# Patient Record
Sex: MALE | Race: OTHER | HISPANIC OR LATINO
[De-identification: names, ages, dates, MRNs, and addresses within clinical notes are randomized per-mention and may not be internally consistent; named-entity substitution may affect disease eponyms.]

---
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
## 2022-01-09 ENCOUNTER — EMERGENCY (EMERGENCY)
Facility: HOSPITAL | Age: 22
LOS: 1 days | Discharge: HOME | End: 2022-01-09
Attending: EMERGENCY MEDICINE
Payer: MEDICAID

## 2022-01-09 VITALS
WEIGHT: 179.9 LBS | RESPIRATION RATE: 18 BRPM | OXYGEN SATURATION: 95 % | TEMPERATURE: 98 F | DIASTOLIC BLOOD PRESSURE: 59 MMHG | HEIGHT: 69 IN | SYSTOLIC BLOOD PRESSURE: 118 MMHG | HEART RATE: 77 BPM

## 2022-01-09 PROBLEM — Z78.9 OTHER SPECIFIED HEALTH STATUS: Chronic | Status: ACTIVE | Noted: 2021-07-27

## 2022-01-09 LAB — SARS-COV-2 RNA SPEC QL NAA+PROBE: DETECTED

## 2022-01-09 PROCEDURE — 99284 EMERGENCY DEPT VISIT MOD MDM: CPT

## 2022-01-09 PROCEDURE — 94640 AIRWAY INHALATION TREATMENT: CPT

## 2022-01-09 PROCEDURE — 87635 SARS-COV-2 COVID-19 AMP PRB: CPT

## 2022-01-09 PROCEDURE — 96374 THER/PROPH/DIAG INJ IV PUSH: CPT

## 2022-01-09 PROCEDURE — 99284 EMERGENCY DEPT VISIT MOD MDM: CPT | Mod: 25

## 2022-01-09 PROCEDURE — 71046 X-RAY EXAM CHEST 2 VIEWS: CPT

## 2022-01-09 PROCEDURE — 71046 X-RAY EXAM CHEST 2 VIEWS: CPT | Mod: 26

## 2022-01-09 RX ORDER — DEXAMETHASONE 0.5 MG/5ML
6 ELIXIR ORAL ONCE
Refills: 0 | Status: COMPLETED | OUTPATIENT
Start: 2022-01-09 | End: 2022-01-09

## 2022-01-09 RX ORDER — IPRATROPIUM/ALBUTEROL SULFATE 18-103MCG
3 AEROSOL WITH ADAPTER (GRAM) INHALATION ONCE
Refills: 0 | Status: COMPLETED | OUTPATIENT
Start: 2022-01-09 | End: 2022-01-09

## 2022-01-09 RX ADMIN — Medication 3 MILLILITER(S): at 03:20

## 2022-01-09 RX ADMIN — Medication 6 MILLIGRAM(S): at 03:34

## 2022-01-09 NOTE — ED PROVIDER NOTE - PATIENT PORTAL LINK FT
You can access the FollowMyHealth Patient Portal offered by [facility] by registering at the following website: [email or web address]. By joining [facility]’s FollowMyHealth portal, you will also be able to view your health information using other applications (apps) compatible with our system.

## 2022-01-09 NOTE — ED ADULT NURSE NOTE - OBJECTIVE STATEMENT
Patient presented to the ED with c/o woke up from sleep with difficulty breathing, chest tightness and cough. Patient denies fever, headache, nausea, vomiting and dizziness.

## 2022-01-09 NOTE — ED PROVIDER NOTE - OBJECTIVE STATEMENT
20 y/o male who denies past medical history, admits to marijuana use occasionally, presents with feeling short of breath with some chest tightness.  pt was diagnosed with COVID approx 1.5 weeks ago.  Pt symptoms of fever and cough have improved till this evening when the patient was sleeping he felt short of breath.  Pt came to ED for further eval.

## 2022-01-09 NOTE — ED PROVIDER NOTE - CLINICAL SUMMARY MEDICAL DECISION MAKING FREE TEXT BOX
pt with recent COVID infection presents with episode of shortness of breath.  Mild expiratory wheeze on exam.  Will give duoneb, decadron, CXR, COVID swab abd reassess.

## 2022-01-09 NOTE — ED PROVIDER NOTE - NSFOLLOWUPINSTRUCTIONS_ED_ALL_ED_FT
Viral Syndrome    WHAT YOU NEED TO KNOW:    Viral syndrome is a term used for symptoms of an infection caused by a virus. Viruses are spread easily from person to person through the air and on shared items.    DISCHARGE INSTRUCTIONS:    Call your local emergency number (911 in the [place]) or have someone else call if:   •You have a seizure.      •You cannot be woken.      •You have chest pain or trouble breathing.      Seek care immediately if:   •You have a stiff neck, a bad headache, and sensitivity to light.      •You feel weak, dizzy, or confused.      •You stop urinating or urinate a lot less than usual.      •You cough up blood or thick yellow or green mucus.      •You have severe abdominal pain or your abdomen is larger than usual.      Call your doctor if:   •Your symptoms do not get better with treatment or get worse after 3 days.      •You have a rash or ear pain.      •You have burning when you urinate.      •You have questions or concerns about your condition or care.      Medicines: You may need any of the following:   •Acetaminophen decreases pain and fever. It is available without a doctor's order. Ask how much to take and how often to take it. Follow directions. Read the labels of all other medicines you are using to see if they also contain acetaminophen, or ask your doctor or pharmacist. Acetaminophen can cause liver damage if not taken correctly. Do not use more than 4 grams (4,000 milligrams) total of acetaminophen in one day.       •NSAIDs, such as ibuprofen, help decrease swelling, pain, and fever. NSAIDs can cause stomach bleeding or kidney problems in certain people. If you take blood thinner medicine, always ask your healthcare provider if NSAIDs are safe for you. Always read the medicine label and follow directions.      •Cold medicine helps decrease swelling, control a cough, and relieve chest or nasal congestion.       •Saline nasal spray helps decrease nasal congestion.       •Take your medicine as directed. Contact your healthcare provider if you think your medicine is not helping or if you have side effects. Tell him of her if you are allergic to any medicine. Keep a list of the medicines, vitamins, and herbs you take. Include the amounts, and when and why you take them. Bring the list or the pill bottles to follow-up visits. Carry your medicine list with you in case of an emergency.      Manage your symptoms:   •Drink liquids as directed to prevent dehydration. Ask how much liquid to drink each day and which liquids are best for you. Ask if you should drink an oral rehydration solution (ORS). An ORS has the right amounts of water, salts, and sugar you need to replace body fluids. This may help prevent dehydration caused by vomiting or diarrhea. Do not drink liquids with caffeine. Liquids with caffeine can make dehydration worse.      •Get plenty of rest to help your body heal. Take naps throughout the day. Ask your healthcare provider when you can return to work and your normal activities.      •Use a cool mist humidifier to help you breathe easier. Ask your healthcare provider how to use a cool mist humidifier.      •Eat honey or use cough drops for a sore throat. Cough drops are available without a doctor's order. Follow directions for taking cough drops.      •Do not smoke or be close to anyone who is smoking. Nicotine and other chemicals in cigarettes and cigars can cause lung damage. Smoking can also delay healing. Ask your healthcare provider for information if you currently smoke and need help to quit. E-cigarettes or smokeless tobacco still contain nicotine. Talk to your healthcare provider before you use these products.      Prevent the spread of germs:          •Wash your hands often. Wash your hands several times each day. Wash after you use the bathroom, change a child's diaper, and before you prepare or eat food. Use soap and water every time. Rub your soapy hands together, lacing your fingers. Wash the front and back of your hands, and in between your fingers. Use the fingers of one hand to scrub under the fingernails of the other hand. Wash for at least 20 seconds. Rinse with warm, running water for several seconds. Then dry your hands with a clean towel or paper towel. Use hand  that contains alcohol if soap and water are not available. Do not touch your eyes, nose, or mouth without washing your hands first.  Handwashing           •Cover a sneeze or cough. Use a tissue that covers your mouth and nose. Throw the tissue away in a trash can right away. Use the bend of your arm if a tissue is not available. Wash your hands well with soap and water or use a hand .      •Stay away from others while you are sick. Avoid crowds as much as possible.      •Ask about vaccines you may need. Talk to your healthcare provider about your vaccine history. He or she will tell you which vaccines you need, and when to get them.?Get the influenza (flu) vaccine as soon as recommended each year. The flu vaccine is available starting in September or October. Flu viruses change, so it is important to get a flu vaccine every year.      ?Get the pneumonia vaccine if recommended. This vaccine is usually recommended every 5 years. Your provider will tell you when to get this vaccine, if needed.        Follow up with your doctor as directed: Write down your questions so you remember to ask them during your visits.       © Copyright [employer] 2022           back to top                          © Copyright [employer] 2022

## 2022-01-13 ENCOUNTER — TRANSCRIPTION ENCOUNTER (OUTPATIENT)
Age: 22
End: 2022-01-13

## 2023-12-24 ENCOUNTER — EMERGENCY (EMERGENCY)
Facility: HOSPITAL | Age: 23
LOS: 1 days | Discharge: HOME | End: 2023-12-24
Attending: EMERGENCY MEDICINE
Payer: MEDICAID

## 2023-12-24 VITALS
SYSTOLIC BLOOD PRESSURE: 125 MMHG | TEMPERATURE: 101 F | RESPIRATION RATE: 18 BRPM | OXYGEN SATURATION: 100 % | DIASTOLIC BLOOD PRESSURE: 74 MMHG | HEART RATE: 86 BPM

## 2023-12-24 VITALS
RESPIRATION RATE: 18 BRPM | WEIGHT: 210.1 LBS | TEMPERATURE: 99 F | SYSTOLIC BLOOD PRESSURE: 137 MMHG | DIASTOLIC BLOOD PRESSURE: 80 MMHG | HEIGHT: 71 IN | OXYGEN SATURATION: 97 % | HEART RATE: 85 BPM

## 2023-12-24 LAB
ALBUMIN SERPL ELPH-MCNC: 3.9 G/DL — SIGNIFICANT CHANGE UP (ref 3.5–5)
ALBUMIN SERPL ELPH-MCNC: 3.9 G/DL — SIGNIFICANT CHANGE UP (ref 3.5–5)
ALP SERPL-CCNC: 79 U/L — SIGNIFICANT CHANGE UP (ref 40–120)
ALP SERPL-CCNC: 79 U/L — SIGNIFICANT CHANGE UP (ref 40–120)
ALT FLD-CCNC: 36 U/L DA — SIGNIFICANT CHANGE UP (ref 10–60)
ALT FLD-CCNC: 36 U/L DA — SIGNIFICANT CHANGE UP (ref 10–60)
ANION GAP SERPL CALC-SCNC: 9 MMOL/L — SIGNIFICANT CHANGE UP (ref 5–17)
ANION GAP SERPL CALC-SCNC: 9 MMOL/L — SIGNIFICANT CHANGE UP (ref 5–17)
AST SERPL-CCNC: 29 U/L — SIGNIFICANT CHANGE UP (ref 10–40)
AST SERPL-CCNC: 29 U/L — SIGNIFICANT CHANGE UP (ref 10–40)
BASOPHILS # BLD AUTO: 0.04 K/UL — SIGNIFICANT CHANGE UP (ref 0–0.2)
BASOPHILS # BLD AUTO: 0.04 K/UL — SIGNIFICANT CHANGE UP (ref 0–0.2)
BASOPHILS NFR BLD AUTO: 0.3 % — SIGNIFICANT CHANGE UP (ref 0–2)
BASOPHILS NFR BLD AUTO: 0.3 % — SIGNIFICANT CHANGE UP (ref 0–2)
BILIRUB SERPL-MCNC: 0.3 MG/DL — SIGNIFICANT CHANGE UP (ref 0.2–1.2)
BILIRUB SERPL-MCNC: 0.3 MG/DL — SIGNIFICANT CHANGE UP (ref 0.2–1.2)
BUN SERPL-MCNC: 11 MG/DL — SIGNIFICANT CHANGE UP (ref 7–18)
BUN SERPL-MCNC: 11 MG/DL — SIGNIFICANT CHANGE UP (ref 7–18)
CALCIUM SERPL-MCNC: 8.2 MG/DL — LOW (ref 8.4–10.5)
CALCIUM SERPL-MCNC: 8.2 MG/DL — LOW (ref 8.4–10.5)
CHLORIDE SERPL-SCNC: 100 MMOL/L — SIGNIFICANT CHANGE UP (ref 96–108)
CHLORIDE SERPL-SCNC: 100 MMOL/L — SIGNIFICANT CHANGE UP (ref 96–108)
CO2 SERPL-SCNC: 25 MMOL/L — SIGNIFICANT CHANGE UP (ref 22–31)
CO2 SERPL-SCNC: 25 MMOL/L — SIGNIFICANT CHANGE UP (ref 22–31)
CREAT SERPL-MCNC: 0.99 MG/DL — SIGNIFICANT CHANGE UP (ref 0.5–1.3)
CREAT SERPL-MCNC: 0.99 MG/DL — SIGNIFICANT CHANGE UP (ref 0.5–1.3)
EGFR: 111 ML/MIN/1.73M2 — SIGNIFICANT CHANGE UP
EGFR: 111 ML/MIN/1.73M2 — SIGNIFICANT CHANGE UP
EOSINOPHIL # BLD AUTO: 0.05 K/UL — SIGNIFICANT CHANGE UP (ref 0–0.5)
EOSINOPHIL # BLD AUTO: 0.05 K/UL — SIGNIFICANT CHANGE UP (ref 0–0.5)
EOSINOPHIL NFR BLD AUTO: 0.3 % — SIGNIFICANT CHANGE UP (ref 0–6)
EOSINOPHIL NFR BLD AUTO: 0.3 % — SIGNIFICANT CHANGE UP (ref 0–6)
FLUAV AG NPH QL: SIGNIFICANT CHANGE UP
FLUAV AG NPH QL: SIGNIFICANT CHANGE UP
FLUBV AG NPH QL: SIGNIFICANT CHANGE UP
FLUBV AG NPH QL: SIGNIFICANT CHANGE UP
GLUCOSE SERPL-MCNC: 101 MG/DL — HIGH (ref 70–99)
GLUCOSE SERPL-MCNC: 101 MG/DL — HIGH (ref 70–99)
HCT VFR BLD CALC: 44.7 % — SIGNIFICANT CHANGE UP (ref 39–50)
HCT VFR BLD CALC: 44.7 % — SIGNIFICANT CHANGE UP (ref 39–50)
HGB BLD-MCNC: 15.1 G/DL — SIGNIFICANT CHANGE UP (ref 13–17)
HGB BLD-MCNC: 15.1 G/DL — SIGNIFICANT CHANGE UP (ref 13–17)
HIV 1 & 2 AB SERPL IA.RAPID: SIGNIFICANT CHANGE UP
HIV 1 & 2 AB SERPL IA.RAPID: SIGNIFICANT CHANGE UP
IMM GRANULOCYTES NFR BLD AUTO: 0.6 % — SIGNIFICANT CHANGE UP (ref 0–0.9)
IMM GRANULOCYTES NFR BLD AUTO: 0.6 % — SIGNIFICANT CHANGE UP (ref 0–0.9)
LACTATE SERPL-SCNC: 1.1 MMOL/L — SIGNIFICANT CHANGE UP (ref 0.7–2)
LACTATE SERPL-SCNC: 1.1 MMOL/L — SIGNIFICANT CHANGE UP (ref 0.7–2)
LIDOCAIN IGE QN: 17 U/L — SIGNIFICANT CHANGE UP (ref 13–75)
LIDOCAIN IGE QN: 17 U/L — SIGNIFICANT CHANGE UP (ref 13–75)
LYMPHOCYTES # BLD AUTO: 0.97 K/UL — LOW (ref 1–3.3)
LYMPHOCYTES # BLD AUTO: 0.97 K/UL — LOW (ref 1–3.3)
LYMPHOCYTES # BLD AUTO: 6.8 % — LOW (ref 13–44)
LYMPHOCYTES # BLD AUTO: 6.8 % — LOW (ref 13–44)
MAGNESIUM SERPL-MCNC: 1.8 MG/DL — SIGNIFICANT CHANGE UP (ref 1.6–2.6)
MAGNESIUM SERPL-MCNC: 1.8 MG/DL — SIGNIFICANT CHANGE UP (ref 1.6–2.6)
MCHC RBC-ENTMCNC: 29.1 PG — SIGNIFICANT CHANGE UP (ref 27–34)
MCHC RBC-ENTMCNC: 29.1 PG — SIGNIFICANT CHANGE UP (ref 27–34)
MCHC RBC-ENTMCNC: 33.8 GM/DL — SIGNIFICANT CHANGE UP (ref 32–36)
MCHC RBC-ENTMCNC: 33.8 GM/DL — SIGNIFICANT CHANGE UP (ref 32–36)
MCV RBC AUTO: 86.1 FL — SIGNIFICANT CHANGE UP (ref 80–100)
MCV RBC AUTO: 86.1 FL — SIGNIFICANT CHANGE UP (ref 80–100)
MONOCYTES # BLD AUTO: 1.33 K/UL — HIGH (ref 0–0.9)
MONOCYTES # BLD AUTO: 1.33 K/UL — HIGH (ref 0–0.9)
MONOCYTES NFR BLD AUTO: 9.3 % — SIGNIFICANT CHANGE UP (ref 2–14)
MONOCYTES NFR BLD AUTO: 9.3 % — SIGNIFICANT CHANGE UP (ref 2–14)
NEUTROPHILS # BLD AUTO: 11.9 K/UL — HIGH (ref 1.8–7.4)
NEUTROPHILS # BLD AUTO: 11.9 K/UL — HIGH (ref 1.8–7.4)
NEUTROPHILS NFR BLD AUTO: 82.7 % — HIGH (ref 43–77)
NEUTROPHILS NFR BLD AUTO: 82.7 % — HIGH (ref 43–77)
NRBC # BLD: 0 /100 WBCS — SIGNIFICANT CHANGE UP (ref 0–0)
NRBC # BLD: 0 /100 WBCS — SIGNIFICANT CHANGE UP (ref 0–0)
PHOSPHATE SERPL-MCNC: 3 MG/DL — SIGNIFICANT CHANGE UP (ref 2.5–4.5)
PHOSPHATE SERPL-MCNC: 3 MG/DL — SIGNIFICANT CHANGE UP (ref 2.5–4.5)
PLATELET # BLD AUTO: 306 K/UL — SIGNIFICANT CHANGE UP (ref 150–400)
PLATELET # BLD AUTO: 306 K/UL — SIGNIFICANT CHANGE UP (ref 150–400)
POTASSIUM SERPL-MCNC: 3.5 MMOL/L — SIGNIFICANT CHANGE UP (ref 3.5–5.3)
POTASSIUM SERPL-MCNC: 3.5 MMOL/L — SIGNIFICANT CHANGE UP (ref 3.5–5.3)
POTASSIUM SERPL-SCNC: 3.5 MMOL/L — SIGNIFICANT CHANGE UP (ref 3.5–5.3)
POTASSIUM SERPL-SCNC: 3.5 MMOL/L — SIGNIFICANT CHANGE UP (ref 3.5–5.3)
PROT SERPL-MCNC: 8 G/DL — SIGNIFICANT CHANGE UP (ref 6–8.3)
PROT SERPL-MCNC: 8 G/DL — SIGNIFICANT CHANGE UP (ref 6–8.3)
RBC # BLD: 5.19 M/UL — SIGNIFICANT CHANGE UP (ref 4.2–5.8)
RBC # BLD: 5.19 M/UL — SIGNIFICANT CHANGE UP (ref 4.2–5.8)
RBC # FLD: 12.4 % — SIGNIFICANT CHANGE UP (ref 10.3–14.5)
RBC # FLD: 12.4 % — SIGNIFICANT CHANGE UP (ref 10.3–14.5)
SARS-COV-2 RNA SPEC QL NAA+PROBE: SIGNIFICANT CHANGE UP
SARS-COV-2 RNA SPEC QL NAA+PROBE: SIGNIFICANT CHANGE UP
SODIUM SERPL-SCNC: 134 MMOL/L — LOW (ref 135–145)
SODIUM SERPL-SCNC: 134 MMOL/L — LOW (ref 135–145)
TROPONIN I, HIGH SENSITIVITY RESULT: 16.2 NG/L — SIGNIFICANT CHANGE UP
TROPONIN I, HIGH SENSITIVITY RESULT: 16.2 NG/L — SIGNIFICANT CHANGE UP
WBC # BLD: 14.37 K/UL — HIGH (ref 3.8–10.5)
WBC # BLD: 14.37 K/UL — HIGH (ref 3.8–10.5)
WBC # FLD AUTO: 14.37 K/UL — HIGH (ref 3.8–10.5)
WBC # FLD AUTO: 14.37 K/UL — HIGH (ref 3.8–10.5)

## 2023-12-24 PROCEDURE — 83735 ASSAY OF MAGNESIUM: CPT

## 2023-12-24 PROCEDURE — 80053 COMPREHEN METABOLIC PANEL: CPT

## 2023-12-24 PROCEDURE — 84484 ASSAY OF TROPONIN QUANT: CPT

## 2023-12-24 PROCEDURE — 36415 COLL VENOUS BLD VENIPUNCTURE: CPT

## 2023-12-24 PROCEDURE — 99284 EMERGENCY DEPT VISIT MOD MDM: CPT | Mod: 25

## 2023-12-24 PROCEDURE — 96374 THER/PROPH/DIAG INJ IV PUSH: CPT

## 2023-12-24 PROCEDURE — 85025 COMPLETE CBC W/AUTO DIFF WBC: CPT

## 2023-12-24 PROCEDURE — 84100 ASSAY OF PHOSPHORUS: CPT

## 2023-12-24 PROCEDURE — 96375 TX/PRO/DX INJ NEW DRUG ADDON: CPT

## 2023-12-24 PROCEDURE — 86703 HIV-1/HIV-2 1 RESULT ANTBDY: CPT

## 2023-12-24 PROCEDURE — 99284 EMERGENCY DEPT VISIT MOD MDM: CPT

## 2023-12-24 PROCEDURE — 83690 ASSAY OF LIPASE: CPT

## 2023-12-24 PROCEDURE — 83605 ASSAY OF LACTIC ACID: CPT

## 2023-12-24 PROCEDURE — 87637 SARSCOV2&INF A&B&RSV AMP PRB: CPT

## 2023-12-24 RX ORDER — ONDANSETRON 8 MG/1
4 TABLET, FILM COATED ORAL ONCE
Refills: 0 | Status: COMPLETED | OUTPATIENT
Start: 2023-12-24 | End: 2023-12-24

## 2023-12-24 RX ORDER — SODIUM CHLORIDE 9 MG/ML
2000 INJECTION INTRAMUSCULAR; INTRAVENOUS; SUBCUTANEOUS ONCE
Refills: 0 | Status: COMPLETED | OUTPATIENT
Start: 2023-12-24 | End: 2023-12-24

## 2023-12-24 RX ORDER — ONDANSETRON 8 MG/1
1 TABLET, FILM COATED ORAL
Qty: 30 | Refills: 0
Start: 2023-12-24 | End: 2023-12-28

## 2023-12-24 RX ORDER — FAMOTIDINE 10 MG/ML
20 INJECTION INTRAVENOUS ONCE
Refills: 0 | Status: COMPLETED | OUTPATIENT
Start: 2023-12-24 | End: 2023-12-24

## 2023-12-24 RX ADMIN — ONDANSETRON 4 MILLIGRAM(S): 8 TABLET, FILM COATED ORAL at 21:11

## 2023-12-24 RX ADMIN — SODIUM CHLORIDE 4000 MILLILITER(S): 9 INJECTION INTRAMUSCULAR; INTRAVENOUS; SUBCUTANEOUS at 21:10

## 2023-12-24 RX ADMIN — FAMOTIDINE 20 MILLIGRAM(S): 10 INJECTION INTRAVENOUS at 21:11

## 2023-12-24 RX ADMIN — Medication 20 MILLIGRAM(S): at 22:41

## 2023-12-24 NOTE — ED PROVIDER NOTE - CLINICAL SUMMARY MEDICAL DECISION MAKING FREE TEXT BOX
23 male with hx of no known medical problems   Pt presenting to the ED reporting 1-2 days of vomiting/diarrhea.   Vitals stable.  Nontoxic appearing, n/v intact. Airway intact, no respiratory distress, no hypoxia. No abdominal or CVA tenderness.    Plan to obtain:    -Labs, IV fluids, analgesia, antiemetics, & antipyretics as needed, observe/re-assess     Lab values demonstrate no acute/emergent pathology.  IV fluids & antiemetics given w improved symptoms. Tolerating PO intake in ED. No abd tender on repeat exam  Pt advised regarding need for close outpatient follow up.  Patient stable for further care in outpatient setting. No indication for inpatient admission at this time. Patient advised regarding symptomatic & supportive care and symptoms to prompt ED return. Strict return precautions provided.

## 2023-12-24 NOTE — ED PROVIDER NOTE - NSFOLLOWUPINSTRUCTIONS_ED_ALL_ED_FT
23 male with hx of no known medical problems   Pt presenting to the ED reporting 2 days of vomiting/diarrhea.   Vitals stable.  Nontoxic appearing, n/v intact. Airway intact, no respiratory distress, no hypoxia. No abdominal or CVA tenderness.    Plan to obtain:    -Labs, IV fluids, analgesia, antiemetics, & antipyretics as needed, observe/re-assess     Lab values demonstrate no acute/emergent pathology.    Independent interpretation of the EKG: Sinus @ ***, normal axis, normal intervals, normal ST/T  Independent interpretation of XR: ***    ***    Pt advised regarding need for close outpatient follow up.  Patient stable for further care in outpatient setting. No indication for inpatient admission at this time. Patient advised regarding symptomatic & supportive care and symptoms to prompt ED return. Strict return precautions provided.  ***  Patient requires inpatient admission for further care & stabilization. Care signed out to inpatient team. Please follow up with your PMD or Medicine Clinic in 2-3 days.  Return to the ER for worsening or concerning symptoms.  Drink plenty of fluids or an oral rehydration solution like Pedialyte, Gatorade, or Powerade.  Keep your diet simple until symptoms improve. Introduce foods as tolerated such as bread, toast, plain rice, boiled potatoes, boiled chicken, bananas, apple sauce, etc. Avoid dairy, spicy, greasy, or fatty foods. Avoid alcohol or tobacco.  Take Famotidine (Pepcid) 20mg (1 tablet) every 12 hours as needed for abdominal pain.  Take Maalox 30mL every 6 hours as needed for abdominal pain.  Take Ondansetron (Zofran) 4mg every 6 hours as needed for nausea or vomiting.    - - - - - - - - - - - - -  Nausea and Vomiting, Adult  Nausea is feeling that you have an upset stomach and that you are about to vomit. Vomiting is when food in your stomach forcefully comes out of your mouth. Vomiting can make you feel weak. If you vomit, or if you are not able to drink enough fluids, you may not have enough water in your body (get dehydrated). If you do not have enough water in your body, you may:  Feel tired.  Feel thirsty.  Have a dry mouth.  Have cracked lips.  Pee (urinate) less often.  Older adults and people with other diseases or a weak body defense system (immune system) are at higher risk for not having enough water in the body. If you feel like you may vomit or you vomit, it is important to follow instructions from your doctor about how to take care of yourself.    Follow these instructions at home:  Watch your symptoms for any changes. Tell your doctor about them.    Eating and drinking    A bottle of clear fruit juice and glass of water.  A sign showing that a person should not drink alcohol.  Take an ORS (oral rehydration solution). This is a drink that is sold at pharmacies and stores.  Drink clear fluids in small amounts as you are able, such as:  Water.  Ice chips.  Fruit juice that has water added (diluted fruit juice).  Low-calorie sports drinks.  Eat bland, easy-to-digest foods in small amounts as you are able, such as:  Bananas.  Applesauce.  Rice.  Low-fat (lean) meats.  Toast.  Crackers.  Avoid drinking fluids that have a lot of sugar or caffeine in them. This includes energy drinks, sports drinks, and soda.  Avoid alcohol.  Avoid spicy or fatty foods.  General instructions    Take over-the-counter and prescription medicines only as told by your doctor.  Drink enough fluid to keep your pee (urine) pale yellow.  Wash your hands often with soap and water for at least 20 seconds. If you cannot use soap and water, use hand .  Make sure that everyone in your home washes their hands well and often.  Rest at home until you feel better.  Watch your condition for any changes.  Take slow and deep breaths when you feel like you may vomit.  Keep all follow-up visits.  Contact a doctor if:  Your symptoms get worse.  You have new symptoms.  You have a fever.  You cannot drink fluids without vomiting.  You feel like you may vomit for more than 2 days.  You feel light-headed or dizzy.  You have a headache.  You have muscle cramps.  You have a rash.  You have pain while peeing.  Get help right away if:  You have pain in your chest, neck, arm, or jaw.  You feel very weak or you faint.  You vomit again and again.  You have vomit that is bright red or looks like black coffee grounds.  You have bloody or black poop (stools) or poop that looks like tar.  You have a very bad headache, a stiff neck, or both.  You have very bad pain, cramping, or bloating in your belly (abdomen).  You have trouble breathing.  You are breathing very quickly.  Your heart is beating very quickly.  Your skin feels cold and clammy.  You feel confused.  You have signs of losing too much water in your body, such as:  Dark pee, very little pee, or no pee.  Cracked lips.  Dry mouth.  Sunken eyes.  Sleepiness.  Weakness.  These symptoms may be an emergency. Get help right away. Call 911.  Do not wait to see if the symptoms will go away.  Do not drive yourself to the hospital.  Summary  Nausea is feeling that you have an upset stomach and that you are about to vomit. Vomiting is when food in your stomach comes out of your mouth.  Follow instructions from your doctor about eating and drinking.  Take over-the-counter and prescription medicines only as told by your doctor.  Contact your doctor if your symptoms get worse or you have new symptoms.  Keep all follow-up visits.  This information is not intended to replace advice given to you by your health care provider. Make sure you discuss any questions you have with your health care provider.  - - - - - - - - - - - - -  Diarrhea, Adult  Diarrhea is when you pass loose and sometimes watery poop (stool) often. Diarrhea can make you feel weak and cause you to lose water in your body (get dehydrated). Losing water in your body can cause you to:  Feel tired and thirsty.  Have a dry mouth.  Go pee (urinate) less often.  Diarrhea often lasts 2–3 days. It can last longer if it is a sign of something more serious. Be sure to treat your diarrhea as told by your doctor.    Follow these instructions at home:  Eating and drinking    A bottle of clear juice and a glass of water.  Bread, rice, and cereal from the grain group.   Follow these instructions as told by your doctor:  Take an ORS (oral rehydration solution). This is a drink that helps you replace fluids and minerals your body lost. It is sold at pharmacies and stores.  Drink enough fluid to keep your pee (urine) pale yellow.  Drink fluids such as:  Water. You can also get fluids by sucking on ice chips.  Diluted fruit juice.  Low-calorie sports drinks.  Milk.  Avoid drinking fluids that have a lot of sugar or caffeine in them. These include soda, energy drinks, and regular sports drinks.  Avoid alcohol.  Eat bland, easy-to-digest foods in small amounts as you are able. These foods include:  Bananas.  Applesauce.  Rice.  Low-fat (lean) meats.  Toast.  Crackers.  Avoid spicy or fatty foods.  Medicines    Take over-the-counter and prescription medicines only as told by your doctor.  If you were prescribed antibiotics, take them as told by your doctor. Do not stop taking them even if you start to feel better.  General instructions    Washing hands with soap and water.  Wash your hands often using soap and water for 20 seconds. If soap and water are not available, use hand . Others in your home should wash their hands as well. Wash your hands:  After using the toilet or changing a diaper.  Before preparing, cooking, or serving food.  While caring for a sick person.  While visiting someone in a hospital.  Rest at home while you get better.  Take a warm bath to help with any burning or pain from having diarrhea.  Watch your condition for any changes.  Contact a doctor if:  You have a fever.  Your diarrhea gets worse.  You have new symptoms.  You vomit every time you eat or drink.  You feel light-headed, dizzy, or you have a headache.  You have muscle cramps.  You have signs of losing too much water in your body, such as:  Dark pee, very little pee, or no pee.  Cracked lips.  Dry mouth.  Sunken eyes.  Sleepiness.  Weakness.  You have bloody or black poop or poop that looks like tar.  You have very bad pain, cramping, or bloating in your belly (abdomen).  Your skin feels cold and clammy.  You feel confused.  Get help right away if:  You have chest pain.  Your heart is beating very quickly.  You have trouble breathing or you are breathing very quickly.  You feel very weak or you faint.  These symptoms may be an emergency. Get help right away. Call 911.  Do not wait to see if the symptoms will go away.  Do not drive yourself to the hospital.  This information is not intended to replace advice given to you by your health care provider. Make sure you discuss any questions you have with your health care provider.

## 2023-12-24 NOTE — ED PROVIDER NOTE - NS ED ROS FT
Constitutional: (-) fever (+) chills  HENT: (-) congestion (-) rhinorrhea (-) sore throat  Eyes: (-) pain (-) redness  Respiratory: (-) cough (-) shortness of breath (-) wheezing (-) stridor    Cardiovascular: (-) chest pain (-) palpitations (-) leg swelling  Gastrointestinal: (+) abdominal cramping only right before BM but not currently (-) blood in stool (no melena/hematochezia) (+) diarrhea (+) vomiting  Genitourinary: (-) dysuria (-) hematuria  Musculoskeletal: (-) gait problem (-) joint swelling (+) myalgias  Skin: (-) color change (-) rash  Neurological: (-) weakness (-) numbness (-) headaches  Psychiatric/Behavioral: (-) confusion

## 2023-12-24 NOTE — ED ADULT NURSE NOTE - NSFALLUNIVINTERV_ED_ALL_ED
Bed/Stretcher in lowest position, wheels locked, appropriate side rails in place/Call bell, personal items and telephone in reach/Instruct patient to call for assistance before getting out of bed/chair/stretcher/Non-slip footwear applied when patient is off stretcher/[place] to call system/Physically safe environment - no spills, clutter or unnecessary equipment/Purposeful proactive rounding/Room/bathroom lighting operational, light cord in reach Bed/Stretcher in lowest position, wheels locked, appropriate side rails in place/Call bell, personal items and telephone in reach/Instruct patient to call for assistance before getting out of bed/chair/stretcher/Non-slip footwear applied when patient is off stretcher/[place] to call system/Physically safe environment - no spills, clutter or unnecessary equipment/Purposeful proactive rounding/Room/bathroom lighting operational, light cord in reach

## 2023-12-24 NOTE — ED PROVIDER NOTE - NSFOLLOWUPCLINICSTOKEN_GEN_ALL_ED_FT
041425:4-6 Days|| ||00\01||False;835141:1-3 Days|| ||00\01||False; 435647:4-6 Days|| ||00\01||False;753817:1-3 Days|| ||00\01||False;

## 2023-12-24 NOTE — ED PROVIDER NOTE - PHYSICAL EXAMINATION
Gen:  Awake, alert, NAD, WDWN, NCAT, non-toxic appearing.   Eyes:  PERRL, EOMI, no icterus, normal lids/lashes, normal conjunctivae.  ENT:  External inspection normal, pink/dry membranes.   CV:  S1S2, regular rate and rhythm, no murmur/gallops/rubs, no JVD, 2+ pulses b/l, no edema/cords/homans, warm/well-perfused.  Resp:  Normal respiratory rate/effort, no respiratory distress, normal voice, speaking full sentences, lungs clear to auscultation b/l, no wheezing/rales/rhonchi, no retractions, no stridor.  Abd:  Soft abdomen, no tender/distended/guarding/rebound, no pulsatile mass, no CVA tender.   Musculoskeletal:  N/V intact, FROM all 4 extremities, normal motor tone, stable gait.   Neck:  FROM neck, supple, trachea midline, no meningismus.  Skin:  Color normal for race, warm and dry, no rash.  Neuro:  Oriented x3, CN 2-12 intact (grossly), normal motor (grossly), normal sensory (grossly), normal gait. GCS 15  Psych:  Attention normal. Affect normal. Behavior normal. Judgment normal.

## 2023-12-24 NOTE — ED PROVIDER NOTE - NSFOLLOWUPCLINICS_GEN_ALL_ED_FT
[facility]  Gastroenterology  [address] [place] [address]  Phone: [phone]  Fax: [phone]  Follow Up Time: 4-6 Days    [facility]  Internal Medicine  [address] [place] [address]  Phone: [phone]  Fax: [phone]  Follow Up Time: 1-3 Days     [facility]  Gastroenterology  [address] [place] [address]  Phone: [phone]  Fax: [phone]  Follow Up Time: 4-6 Days    [facility]  Internal Medicine  [address] [place] [address]  Phone: [phone]  Fax: [phone]  Follow Up Time: 1-3 Days

## 2023-12-24 NOTE — ED PROVIDER NOTE - PROGRESS NOTE DETAILS
Results reviewed.  Pt reports feeling better. No abd tender on reassessment  Tolerating PO intake.  Pt/family advised regarding symptomatic/supportive care, importance of PMD follow up, and symptoms to prompt ED return.

## 2023-12-24 NOTE — ED PROVIDER NOTE - OBJECTIVE STATEMENT
23 male with hx of no known medical problems   Pt presenting to the ED reporting 2 days of vomiting/diarrhea.   No recent travel, hospitalization, or immobilization. No recent abx or sick contacts. No new or suspected bad foods. 23 male with hx of no known medical problems   Pt presenting to the ED reporting 1-2 days of vomiting/diarrhea.   No recent travel, hospitalization, or immobilization. No recent abx or sick contacts. No new or suspected bad foods.

## 2023-12-24 NOTE — ED PROVIDER NOTE - PATIENT PORTAL LINK FT
You can access the FollowMyHealth Patient Portal offered by [facility] by registering at the following website: [email or web address]. By joining [employer]’s FollowMyHealth portal, you will also be able to view your health information using other applications (apps) compatible with our system. You can access the FollowMyHealth Patient Portal offered by [facility] by registering at the following website: [email or web address]. By joining [facility]s FollowMyHealth portal, you will also be able to view your health information using other applications (apps) compatible with our system.

## 2023-12-25 RX ORDER — ACETAMINOPHEN 500 MG
650 TABLET ORAL ONCE
Refills: 0 | Status: DISCONTINUED | OUTPATIENT
Start: 2023-12-25 | End: 2023-12-28

## 2025-01-20 ENCOUNTER — NON-APPOINTMENT (OUTPATIENT)
Age: 25
End: 2025-01-20